# Patient Record
Sex: FEMALE | URBAN - METROPOLITAN AREA
[De-identification: names, ages, dates, MRNs, and addresses within clinical notes are randomized per-mention and may not be internally consistent; named-entity substitution may affect disease eponyms.]

---

## 2017-10-12 ENCOUNTER — IMPORTED ENCOUNTER (OUTPATIENT)
Dept: URBAN - METROPOLITAN AREA CLINIC 31 | Facility: CLINIC | Age: 72
End: 2017-10-12

## 2017-10-12 PROBLEM — H17.89: Noted: 2017-10-12

## 2017-10-12 PROBLEM — H25.13: Noted: 2017-10-12

## 2017-10-12 PROCEDURE — 92015 DETERMINE REFRACTIVE STATE: CPT

## 2017-10-12 PROCEDURE — 92004 COMPRE OPH EXAM NEW PT 1/>: CPT

## 2017-10-12 NOTE — PATIENT DISCUSSION
1.  Nuclear Sclerotic Cataract OU: Explained how cataracts can effect vision. Recommend clinical observation. The patient was advised to contact us if any change or worsening of vision. 2.  s/p RK- refractive shift discussed. Monitor3. Refractive error - Recommend distance correction. Can use FT 28 BF. 4. Return for an appointment in 1 year for comprehensive exam. with Dr. King Brandt.

## 2018-09-27 ENCOUNTER — IMPORTED ENCOUNTER (OUTPATIENT)
Dept: URBAN - METROPOLITAN AREA CLINIC 31 | Facility: CLINIC | Age: 73
End: 2018-09-27

## 2018-09-27 PROBLEM — H25.13: Noted: 2018-09-27

## 2018-09-27 PROBLEM — H43.813: Noted: 2018-09-27

## 2018-09-27 PROCEDURE — 92015 DETERMINE REFRACTIVE STATE: CPT

## 2018-09-27 PROCEDURE — 92014 COMPRE OPH EXAM EST PT 1/>: CPT

## 2022-04-02 ASSESSMENT — TONOMETRY
OD_IOP_MMHG: 15
OS_IOP_MMHG: 15
OD_IOP_MMHG: 15
OS_IOP_MMHG: 14

## 2022-04-02 ASSESSMENT — VISUAL ACUITY
OD_CC: J1+
OS_CC: J1
OD_SC: 20/20-1
OS_SC: 20/25-3
OS_CC: 20/40-2
OD_CC: 20/60

## 2022-07-09 ENCOUNTER — TELEPHONE ENCOUNTER (OUTPATIENT)
Dept: URBAN - METROPOLITAN AREA CLINIC 121 | Facility: CLINIC | Age: 77
End: 2022-07-09

## 2022-07-09 RX ORDER — RALOXIFENE HCL 60 MG
TABLET ORAL ONCE A DAY
Refills: 0 | OUTPATIENT
Start: 2014-01-20 | End: 2019-07-09

## 2022-07-09 RX ORDER — LISINOPRIL 2.5 MG/1
TABLET ORAL ONCE A DAY
Refills: 0 | OUTPATIENT
Start: 2019-05-21 | End: 2019-07-09

## 2022-07-09 RX ORDER — ACETAMINOPHEN, DEXTROMETHORPHAN HBR, DOXYLAMINE SUCCINATE, PHENYLEPHRINE HCL 10-5-325MG
KIT ORAL ONCE A DAY
Refills: 0 | OUTPATIENT
Start: 2019-05-21 | End: 2019-07-09

## 2022-07-09 RX ORDER — DOCUSATE SODIUM 100 MG/1
CAPSULE, LIQUID FILLED ORAL ONCE A DAY
Refills: 0 | OUTPATIENT
Start: 2014-01-20 | End: 2019-07-09

## 2022-07-09 RX ORDER — RISEDRONATE SODIUM 35 MG/1
TABLET, FILM COATED ORAL TAKE AS DIRECTED
Refills: 0 | OUTPATIENT
Start: 2019-05-21 | End: 2019-07-09

## 2022-07-09 RX ORDER — DOCUSATE SODIUM 100 MG/1
CAPSULE ORAL ONCE A DAY
Refills: 0 | OUTPATIENT
Start: 2019-05-21 | End: 2019-07-09

## 2022-07-09 RX ORDER — SIMVASTATIN 10 MG/1
TABLET, FILM COATED ORAL ONCE A DAY
Refills: 0 | OUTPATIENT
Start: 2014-01-20 | End: 2019-05-21

## 2022-07-09 RX ORDER — SIMVASTATIN 10 MG/1
TABLET, FILM COATED ORAL ONCE A DAY
Refills: 0 | OUTPATIENT
Start: 2019-05-21 | End: 2019-07-09

## 2022-07-09 RX ORDER — UBIDECARENONE 200 MG
CAPSULE ORAL ONCE A DAY
Refills: 0 | OUTPATIENT
Start: 2014-01-20 | End: 2019-07-09

## 2022-07-10 ENCOUNTER — TELEPHONE ENCOUNTER (OUTPATIENT)
Dept: URBAN - METROPOLITAN AREA CLINIC 121 | Facility: CLINIC | Age: 77
End: 2022-07-10

## 2022-07-10 RX ORDER — SIMVASTATIN 10 MG/1
TABLET, FILM COATED ORAL ONCE A DAY
Refills: 0 | Status: ACTIVE | COMMUNITY
Start: 2019-07-09

## 2022-07-10 RX ORDER — ACETAMINOPHEN, DEXTROMETHORPHAN HBR, DOXYLAMINE SUCCINATE, PHENYLEPHRINE HCL 10-5-325MG
KIT ORAL ONCE A DAY
Refills: 0 | Status: ACTIVE | COMMUNITY
Start: 2019-07-09

## 2022-07-10 RX ORDER — LISINOPRIL 2.5 MG/1
TABLET ORAL ONCE A DAY
Refills: 0 | Status: ACTIVE | COMMUNITY
Start: 2019-07-09

## 2022-07-10 RX ORDER — DOCUSATE SODIUM 100 MG/1
CAPSULE, LIQUID FILLED ORAL ONCE A DAY
Refills: 0 | Status: ACTIVE | COMMUNITY
Start: 2019-07-09

## 2022-07-10 RX ORDER — RISEDRONATE SODIUM 35 MG/1
ONCE A WEEK TABLET, FILM COATED ORAL TAKE AS DIRECTED
Refills: 0 | Status: ACTIVE | COMMUNITY
Start: 2019-07-09

## 2022-07-10 RX ORDER — DIAPER,BRIEF,INFANT-TODD,DISP
EACH MISCELLANEOUS TWICE A DAY
Refills: 0 | Status: ACTIVE | COMMUNITY
Start: 2019-07-09

## 2022-07-10 RX ORDER — DOCUSATE SODIUM 100 MG/1
CAPSULE ORAL ONCE A DAY
Refills: 0 | Status: ACTIVE | COMMUNITY
Start: 2019-07-09

## 2022-07-10 RX ORDER — UBIDECARENONE 200 MG
CAPSULE ORAL ONCE A DAY
Refills: 0 | Status: ACTIVE | COMMUNITY
Start: 2019-07-09

## 2023-09-18 ENCOUNTER — TELEPHONE ENCOUNTER (OUTPATIENT)
Dept: URBAN - METROPOLITAN AREA CLINIC 63 | Facility: CLINIC | Age: 78
End: 2023-09-18

## 2024-10-24 ENCOUNTER — P2P PATIENT RECORD (OUTPATIENT)
Age: 79
End: 2024-10-24

## 2024-11-25 ENCOUNTER — LAB OUTSIDE AN ENCOUNTER (OUTPATIENT)
Dept: URBAN - METROPOLITAN AREA CLINIC 63 | Facility: CLINIC | Age: 79
End: 2024-11-25

## 2024-11-25 ENCOUNTER — OFFICE VISIT (OUTPATIENT)
Dept: URBAN - METROPOLITAN AREA CLINIC 63 | Facility: CLINIC | Age: 79
End: 2024-11-25
Payer: MEDICARE

## 2024-11-25 ENCOUNTER — DASHBOARD ENCOUNTERS (OUTPATIENT)
Age: 79
End: 2024-11-25

## 2024-11-25 VITALS
HEIGHT: 64 IN | DIASTOLIC BLOOD PRESSURE: 80 MMHG | RESPIRATION RATE: 16 BRPM | TEMPERATURE: 95.7 F | SYSTOLIC BLOOD PRESSURE: 132 MMHG | HEART RATE: 81 BPM | OXYGEN SATURATION: 96 % | WEIGHT: 132 LBS | BODY MASS INDEX: 22.53 KG/M2

## 2024-11-25 DIAGNOSIS — Z12.11 COLON CANCER SCREENING: ICD-10-CM

## 2024-11-25 PROCEDURE — 99213 OFFICE O/P EST LOW 20 MIN: CPT

## 2024-11-25 RX ORDER — DIAPER,BRIEF,INFANT-TODD,DISP
EACH MISCELLANEOUS TWICE A DAY
Refills: 0 | Status: ACTIVE | COMMUNITY

## 2024-11-25 RX ORDER — UBIDECARENONE 200 MG
CAPSULE ORAL ONCE A DAY
Refills: 0 | Status: ACTIVE | COMMUNITY

## 2024-11-25 RX ORDER — ACETAMINOPHEN, DEXTROMETHORPHAN HBR, DOXYLAMINE SUCCINATE, PHENYLEPHRINE HCL 10-5-325MG
KIT ORAL ONCE A DAY
Refills: 0 | Status: ON HOLD | COMMUNITY

## 2024-11-25 RX ORDER — LISINOPRIL 2.5 MG/1
TABLET ORAL
Qty: 180 TABLET | Status: ACTIVE | COMMUNITY

## 2024-11-25 RX ORDER — SIMVASTATIN 40 MG/1
TABLET, FILM COATED ORAL
Qty: 90 TABLET | Status: ACTIVE | COMMUNITY

## 2024-11-25 NOTE — HPI-PREVIOUS LABS
Labs dated 10/16/2024 Lipid panel - LDL cholesterol 111 - Within normal limits . CMP - Glucose 107 - Creatinine 1.01 - GFR 57 - Rest within normal limits . TSH - Within normal limits . CBC -Absolute eosinophils 0

## 2024-11-25 NOTE — HPI-TODAY'S VISIT:
This is a very pleasant 79-year-old female who presents to the office with a chief complaint of "DAC eval, age".  Past medical history is significant for hyperlipidemia, hypertension, skin cancer, colon polyps, diverticulosis.  Past surgical history is significant for colonoscopy.  Family history is significant for maternal breast cancer. Last colonoscopy 6/20/2019, Dr. Garg, 5-year recall. Patient is endoscopy naive. Cardiologist: none. . Patient presents today explaining she is doing well and is without GI complaint.  She explains that she was given a recall of 5 years from her last colonoscopy.  I explained that due to her advanced age, this will be her last screening colonoscopy for CRC, unless she becomes symptomatic.  Patient does have a history of chronic kidney disease, calculated creatinine clearance is 43 mL/min per labs performed on 10/16/2024. . Patient denies abdominal pain, belching, bloating, constipation, diarrhea, dysphagia, pyrosis, melena, hematochezia, hematemesis, nausea, vomiting, BRBPR, and unintentional weight loss.

## 2024-12-04 ENCOUNTER — COMPREHENSIVE EXAM (OUTPATIENT)
Age: 79
End: 2024-12-04

## 2024-12-04 DIAGNOSIS — H52.4: ICD-10-CM

## 2024-12-04 DIAGNOSIS — H43.813: ICD-10-CM

## 2024-12-04 DIAGNOSIS — H52.11: ICD-10-CM

## 2024-12-04 DIAGNOSIS — H25.13: ICD-10-CM

## 2024-12-04 DIAGNOSIS — Z98.890: ICD-10-CM

## 2024-12-04 DIAGNOSIS — H52.223: ICD-10-CM

## 2024-12-04 PROCEDURE — 99214 OFFICE O/P EST MOD 30 MIN: CPT

## 2024-12-04 PROCEDURE — 92015 DETERMINE REFRACTIVE STATE: CPT

## 2025-03-21 ENCOUNTER — OFFICE VISIT (OUTPATIENT)
Dept: URBAN - METROPOLITAN AREA SURGERY CENTER 4 | Facility: SURGERY CENTER | Age: 80
End: 2025-03-21

## 2025-04-04 ENCOUNTER — OFFICE VISIT (OUTPATIENT)
Dept: URBAN - METROPOLITAN AREA CLINIC 63 | Facility: CLINIC | Age: 80
End: 2025-04-04

## 2025-04-11 ENCOUNTER — CLAIMS CREATED FROM THE CLAIM WINDOW (OUTPATIENT)
Dept: URBAN - METROPOLITAN AREA SURGERY CENTER 4 | Facility: SURGERY CENTER | Age: 80
End: 2025-04-11
Payer: MEDICARE

## 2025-04-11 DIAGNOSIS — Z86.0100 PERSONAL HISTORY OF COLON POLYPS, UNSPECIFIED: ICD-10-CM

## 2025-04-11 DIAGNOSIS — K64.1 SECOND DEGREE HEMORRHOIDS: ICD-10-CM

## 2025-04-11 DIAGNOSIS — Z12.11 COLON CANCER SCREENING: ICD-10-CM

## 2025-04-11 DIAGNOSIS — K57.30 DIVERTICULOSIS OF LARGE INTESTINE WITHOUT PERFORATION OR ABSCESS WITHOUT BLEEDING: ICD-10-CM

## 2025-04-11 PROCEDURE — G0105 COLORECTAL SCRN; HI RISK IND: HCPCS | Performed by: INTERNAL MEDICINE

## 2025-04-11 PROCEDURE — 0529F INTRVL 3/>YR PTS CLNSCP DOCD: CPT | Performed by: INTERNAL MEDICINE

## 2025-04-11 PROCEDURE — 00812 ANES LWR INTST SCR COLSC: CPT | Performed by: NURSE ANESTHETIST, CERTIFIED REGISTERED

## 2025-04-11 RX ORDER — UBIDECARENONE 200 MG
CAPSULE ORAL ONCE A DAY
Refills: 0 | Status: ACTIVE | COMMUNITY

## 2025-04-11 RX ORDER — LISINOPRIL 2.5 MG/1
TABLET ORAL
Qty: 180 TABLET | Status: ACTIVE | COMMUNITY

## 2025-04-11 RX ORDER — DIAPER,BRIEF,INFANT-TODD,DISP
EACH MISCELLANEOUS TWICE A DAY
Refills: 0 | Status: ACTIVE | COMMUNITY

## 2025-04-11 RX ORDER — SIMVASTATIN 40 MG/1
TABLET, FILM COATED ORAL
Qty: 90 TABLET | Status: ACTIVE | COMMUNITY

## 2025-04-11 RX ORDER — ACETAMINOPHEN, DEXTROMETHORPHAN HBR, DOXYLAMINE SUCCINATE, PHENYLEPHRINE HCL 10-5-325MG
KIT ORAL ONCE A DAY
Refills: 0 | Status: ON HOLD | COMMUNITY

## 2025-04-23 ENCOUNTER — OFFICE VISIT (OUTPATIENT)
Dept: URBAN - METROPOLITAN AREA CLINIC 63 | Facility: CLINIC | Age: 80
End: 2025-04-23
Payer: MEDICARE

## 2025-04-23 DIAGNOSIS — K57.90 DIVERTICULAR DISEASE: ICD-10-CM

## 2025-04-23 PROBLEM — 397881000: Status: ACTIVE | Noted: 2025-04-23

## 2025-04-23 PROCEDURE — 99213 OFFICE O/P EST LOW 20 MIN: CPT

## 2025-04-23 RX ORDER — LISINOPRIL 2.5 MG/1
TABLET ORAL
Qty: 180 TABLET | Status: ACTIVE | COMMUNITY

## 2025-04-23 RX ORDER — DIAPER,BRIEF,INFANT-TODD,DISP
EACH MISCELLANEOUS TWICE A DAY
Refills: 0 | Status: ACTIVE | COMMUNITY

## 2025-04-23 RX ORDER — UBIDECARENONE 200 MG
CAPSULE ORAL ONCE A DAY
Refills: 0 | Status: ACTIVE | COMMUNITY

## 2025-04-23 RX ORDER — SIMVASTATIN 40 MG/1
TABLET, FILM COATED ORAL
Qty: 90 TABLET | Status: ACTIVE | COMMUNITY

## 2025-04-23 NOTE — HPI-TODAY'S VISIT:
This is a very pleasant 79-year-old female who presents to the office with a chief complaint of "DAC eval, age".  Past medical history is significant for hyperlipidemia, hypertension, skin cancer, colon polyps, diverticulosis.  Past surgical history is significant for colonoscopy.  Family history is significant for maternal breast cancer. Last colonoscopy 4/11/2025, Dr. Garg, 5-year recall Patient is endoscopy naive. Cardiologist: none. . Patient was last seen in the office on 11/25/2024 for DAC eval.  At last visit, patient was doing well and had no GI complaints.  She had a 5-year recall on her last colonoscopy, surveillance colonoscopy ordered. . Patient presents today explaining she tolerated her procedure well and without complications.  I reviewed patient's colonoscopy op and pathology report with her in detail and answered all questions.  I explained that no polyps were seen and due to this and her advanced age, no recall for screening was given.  Patient was otherwise doing well, I explained she can follow-up with us as needed. . -Patient will start a regimen of Benefiber daily, starting with one serving, daily, for a few days, then increasing the servings every few days as needed, to a max per the instructions on the bottle.  -Patient can also incorporate MiraLAX/ClearLAX into their regimen to be taken as needed at first,then increased to daily if needed, per the instructions on the bottle. d

## 2025-04-23 NOTE — HPI-PREVIOUS PROCEDURES
Colonoscopy, 4/11/2025, Dr. Garg - Internal hemorrhoids. - Diverticulosis in the left colon. - The examination was otherwise normal on direct retroflexion views. - No specimens collected. - No repeat colonoscopy due to current age, per Dr. Garg.

## 2025-06-26 ENCOUNTER — COMPREHENSIVE EXAM (OUTPATIENT)
Age: 80
End: 2025-06-26

## 2025-06-26 DIAGNOSIS — H52.11: ICD-10-CM

## 2025-06-26 DIAGNOSIS — H25.13: ICD-10-CM

## 2025-06-26 DIAGNOSIS — H43.813: ICD-10-CM

## 2025-06-26 DIAGNOSIS — Z98.890: ICD-10-CM

## 2025-06-26 PROCEDURE — 92014 COMPRE OPH EXAM EST PT 1/>: CPT

## 2025-06-26 PROCEDURE — 92015 DETERMINE REFRACTIVE STATE: CPT
